# Patient Record
Sex: FEMALE | Race: OTHER | HISPANIC OR LATINO | URBAN - METROPOLITAN AREA
[De-identification: names, ages, dates, MRNs, and addresses within clinical notes are randomized per-mention and may not be internally consistent; named-entity substitution may affect disease eponyms.]

---

## 2022-04-14 ENCOUNTER — HOSPITAL ENCOUNTER (EMERGENCY)
Facility: MEDICAL CENTER | Age: 27
End: 2022-04-14
Attending: EMERGENCY MEDICINE

## 2022-04-14 VITALS
WEIGHT: 150.57 LBS | SYSTOLIC BLOOD PRESSURE: 139 MMHG | HEART RATE: 66 BPM | RESPIRATION RATE: 18 BRPM | OXYGEN SATURATION: 99 % | DIASTOLIC BLOOD PRESSURE: 83 MMHG | BODY MASS INDEX: 26.68 KG/M2 | HEIGHT: 63 IN | TEMPERATURE: 97.9 F

## 2022-04-14 DIAGNOSIS — R51.9 ACUTE NONINTRACTABLE HEADACHE, UNSPECIFIED HEADACHE TYPE: ICD-10-CM

## 2022-04-14 PROCEDURE — 99282 EMERGENCY DEPT VISIT SF MDM: CPT

## 2022-04-14 RX ORDER — FEXOFENADINE HCL 180 MG/1
180 TABLET ORAL DAILY
Qty: 14 TABLET | Refills: 0 | Status: SHIPPED | OUTPATIENT
Start: 2022-04-14

## 2022-04-14 NOTE — ED TRIAGE NOTES
Ambulates to triage  Chief Complaint   Patient presents with   • Headache     On and off for a couple days, lasts for a minute then resolves on it's own, currently asympotmatic     Denies hx of headaches, has tot taken any OTC meds for this. Currently pt bal snot have any sx.

## 2022-04-14 NOTE — ED PROVIDER NOTES
"ED Provider Note    CHIEF COMPLAINT  Chief Complaint   Patient presents with   • Headache     On and off for a couple days, lasts for a minute then resolves on it's own, currently asympotmatic       HPI  Claudine Lai is a 26 y.o. female who presents to the emergency department with a chief complaint of headache.  Patient says that she has had an intermittent headache that seems to be somewhat fleeting in nature.  She is not had any head trauma.  No nausea or vomiting.  No light sensitivity.  No fevers.  She also has felt somewhat dizzy.  She is unable to further characterize her dizziness.  Seems to be both somewhat lightheaded and somewhat vertiginous.  Patient localizes the headache to the frontal part of her head and over her sinuses.    REVIEW OF SYSTEMS  See HPI for further details. All other systems are negative.     PAST MEDICAL HISTORY  History reviewed. No pertinent past medical history.    FAMILY HISTORY  History reviewed. No pertinent family history.    SOCIAL HISTORY  Social History     Socioeconomic History   • Marital status: Single   Tobacco Use   • Smoking status: Never Smoker   • Smokeless tobacco: Never Used   Substance and Sexual Activity   • Alcohol use: Never   • Drug use: Never       SURGICAL HISTORY  Past Surgical History:   Procedure Laterality Date   • PRIMARY C SECTION      x3       CURRENT MEDICATIONS  Home Medications     Reviewed by Guillermina Unger R.N. (Registered Nurse) on 04/14/22 at 1056  Med List Status: Complete   Medication Last Dose Status        Patient Timoteo Taking any Medications                       ALLERGIES  No Known Allergies    PHYSICAL EXAM  VITAL SIGNS: /87   Pulse 80   Temp 36.5 °C (97.7 °F) (Temporal)   Resp 19   Ht 1.6 m (5' 3\")   Wt 68.3 kg (150 lb 9.2 oz)   LMP 03/18/2022 (Approximate)   SpO2 99%   BMI 26.67 kg/m²   Constitutional:  Well developed, Well nourished, No acute distress, Non-toxic appearance.   HENT: Normocephalic, atraumatic, " "oropharynx moist.  The frontal sinuses are nontender.  The right maxillary sinus is nontender.  Patient does have some tenderness over the left maxillary sinus.  The nasal mucosa appears to be inflamed and boggy.  Eyes: PERRLA, EOMI, Conjunctiva normal, No discharge.   Neck: Normal range of motion, No tenderness, Supple, No stridor.  No meningismus.  Lymphatic: No lymphadenopathy noted.   Cardiovascular: Normal heart rate, Normal rhythm, No murmurs, No rubs, No gallops.   Thorax & Lungs: Normal breath sounds, No respiratory distress, No wheezing, No chest tenderness.   Skin: Warm, Dry, No erythema, No rash.   Extremities: Intact distal pulses, No edema, No tenderness, No cyanosis, No clubbing.   Neurologic: Alert & oriented x 3, Normal motor function, Normal sensory function, No focal deficits noted.   Psychiatric: Affect normal, Judgment normal, Mood normal.     RADIOLOGY/PROCEDURES      COURSE & MEDICAL DECISION MAKING  Pertinent Labs & Imaging studies reviewed. (See chart for details)    Patient presents today with intermittent headaches.  She has tenderness over the left maxillary sinus.  I feel patient likely has some component of allergic rhinitis as the etiology of her symptoms.  I will treat the patient with fexofenadine.  In addition she is to use Afrin for the next 3 days.  There is no \"red flags\".  I do not feel that she would benefit from further evaluation at this time.  Patient is discharged stable condition.    The patient will return for new or worsening symptoms and is stable at the time of discharge.    The patient is referred to a primary physician for blood pressure management, diabetic screening, and for all other preventative health concerns.    DISPOSITION:  Patient will be discharged home in stable condition.    FOLLOW UP:  [facility], Emergency Dept  [address] [facility] [address] [phone]    If symptoms worsen      OUTPATIENT MEDICATIONS:  New " Prescriptions    FEXOFENADINE (ALLEGRA) 180 MG TABLET    Take 1 Tablet by mouth every day.         FINAL IMPRESSION  1. Acute nonintractable headache, unspecified headache type            Electronically signed by: Ronaldo Bernabe M.D., 4/14/2022 11:54 AM

## 2022-04-14 NOTE — ED NOTES
Discharge orders received from ERP. AVS provided and explained to patient. Patient AOx4 and ambulatory. Patient discharged to self without incident.